# Patient Record
Sex: MALE | RURAL
[De-identification: names, ages, dates, MRNs, and addresses within clinical notes are randomized per-mention and may not be internally consistent; named-entity substitution may affect disease eponyms.]

---

## 2023-03-14 ENCOUNTER — TELEPHONE (OUTPATIENT)
Dept: FAMILY MEDICINE CLINIC | Facility: CLINIC | Age: 73
End: 2023-03-14

## 2023-03-14 RX ORDER — HYDROCHLOROTHIAZIDE 12.5 MG/1
CAPSULE, GELATIN COATED ORAL
Qty: 90 CAPSULE | Refills: 0 | Status: SHIPPED | OUTPATIENT
Start: 2023-03-14 | End: 2023-03-14 | Stop reason: SDUPTHER

## 2023-03-14 RX ORDER — AMLODIPINE BESYLATE AND BENAZEPRIL HYDROCHLORIDE 10; 40 MG/1; MG/1
1 CAPSULE ORAL DAILY
Qty: 90 CAPSULE | Refills: 0 | Status: SHIPPED | OUTPATIENT
Start: 2023-03-14 | End: 2023-03-17 | Stop reason: SDUPTHER

## 2023-03-14 RX ORDER — HYDROCHLOROTHIAZIDE 12.5 MG/1
12.5 CAPSULE, GELATIN COATED ORAL DAILY
Qty: 90 CAPSULE | Refills: 0 | Status: SHIPPED | OUTPATIENT
Start: 2023-03-14 | End: 2023-03-17 | Stop reason: SDUPTHER

## 2023-03-14 RX ORDER — AMLODIPINE BESYLATE AND BENAZEPRIL HYDROCHLORIDE 10; 40 MG/1; MG/1
CAPSULE ORAL
Qty: 90 CAPSULE | Refills: 0 | Status: SHIPPED | OUTPATIENT
Start: 2023-03-14 | End: 2023-03-14 | Stop reason: SDUPTHER

## 2023-03-14 NOTE — TELEPHONE ENCOUNTER
Pt contacted appt scheduled will you send one month so that he does not run out. [facility] pharmacy told to arrive 15 mins early to his apt since hasnt been here since nextgen.  Confirmed meds and doses with pt.

## 2023-03-14 NOTE — TELEPHONE ENCOUNTER
The prescriptions to tide him over until his appt cannot be sent to pharmacy because he has not been seen since switching to [employer]. I have printed them, and he can  or you can fax/call them in to pharmacy. This is because the patient's demographics are not up to date. They are in TO DO box at my work station

## 2023-03-17 ENCOUNTER — TELEPHONE (OUTPATIENT)
Dept: FAMILY MEDICINE CLINIC | Facility: CLINIC | Age: 73
End: 2023-03-17

## 2023-03-17 RX ORDER — AMLODIPINE BESYLATE AND BENAZEPRIL HYDROCHLORIDE 10; 40 MG/1; MG/1
1 CAPSULE ORAL DAILY
Qty: 90 CAPSULE | Refills: 0 | Status: SHIPPED | OUTPATIENT
Start: 2023-03-17 | End: 2023-03-17 | Stop reason: SDUPTHER

## 2023-03-17 RX ORDER — HYDROCHLOROTHIAZIDE 12.5 MG/1
12.5 CAPSULE, GELATIN COATED ORAL DAILY
Qty: 90 CAPSULE | Refills: 0 | Status: SHIPPED | OUTPATIENT
Start: 2023-03-17

## 2023-03-17 RX ORDER — AMLODIPINE BESYLATE AND BENAZEPRIL HYDROCHLORIDE 10; 40 MG/1; MG/1
1 CAPSULE ORAL DAILY
Qty: 90 CAPSULE | Refills: 0 | Status: SHIPPED | OUTPATIENT
Start: 2023-03-17

## 2023-03-17 RX ORDER — HYDROCHLOROTHIAZIDE 12.5 MG/1
12.5 CAPSULE, GELATIN COATED ORAL DAILY
Qty: 90 CAPSULE | Refills: 0 | Status: SHIPPED | OUTPATIENT
Start: 2023-03-17 | End: 2023-03-17 | Stop reason: SDUPTHER

## 2023-05-11 ENCOUNTER — OFFICE VISIT (OUTPATIENT)
Dept: FAMILY MEDICINE CLINIC | Facility: CLINIC | Age: 73
End: 2023-05-11
Payer: MEDICARE

## 2023-05-11 ENCOUNTER — TELEPHONE (OUTPATIENT)
Dept: FAMILY MEDICINE CLINIC | Facility: CLINIC | Age: 73
End: 2023-05-11

## 2023-05-11 VITALS
OXYGEN SATURATION: 97 % | SYSTOLIC BLOOD PRESSURE: 120 MMHG | DIASTOLIC BLOOD PRESSURE: 74 MMHG | WEIGHT: 180 LBS | HEIGHT: 69 IN | BODY MASS INDEX: 26.66 KG/M2

## 2023-05-11 DIAGNOSIS — Z79.899 ENCOUNTER FOR LONG-TERM (CURRENT) USE OF OTHER MEDICATIONS: ICD-10-CM

## 2023-05-11 DIAGNOSIS — Z12.5 PROSTATE CANCER SCREENING: ICD-10-CM

## 2023-05-11 DIAGNOSIS — Z90.49 H/O WHIPPLE PROCEDURE: ICD-10-CM

## 2023-05-11 DIAGNOSIS — Z00.01 ENCOUNTER FOR GENERAL ADULT MEDICAL EXAMINATION WITH ABNORMAL FINDINGS: Primary | ICD-10-CM

## 2023-05-11 DIAGNOSIS — E55.9 VITAMIN D INSUFFICIENCY: ICD-10-CM

## 2023-05-11 DIAGNOSIS — I10 ESSENTIAL HYPERTENSION, BENIGN: ICD-10-CM

## 2023-05-11 DIAGNOSIS — Z85.46 HISTORY OF PROSTATE CANCER: ICD-10-CM

## 2023-05-11 DIAGNOSIS — Z86.010 HISTORY OF COLON POLYPS: ICD-10-CM

## 2023-05-11 DIAGNOSIS — K21.9 GASTROESOPHAGEAL REFLUX DISEASE WITHOUT ESOPHAGITIS: ICD-10-CM

## 2023-05-11 DIAGNOSIS — Z90.410 H/O WHIPPLE PROCEDURE: ICD-10-CM

## 2023-05-11 DIAGNOSIS — K86.81 EXOCRINE PANCREATIC INSUFFICIENCY: ICD-10-CM

## 2023-05-11 DIAGNOSIS — E78.5 DYSLIPIDEMIA: ICD-10-CM

## 2023-05-11 DIAGNOSIS — R73.9 BLOOD GLUCOSE ELEVATED: ICD-10-CM

## 2023-05-11 PROBLEM — Z86.0100 HISTORY OF COLON POLYPS: Status: ACTIVE | Noted: 2023-05-11

## 2023-05-11 RX ORDER — AMLODIPINE BESYLATE AND BENAZEPRIL HYDROCHLORIDE 10; 40 MG/1; MG/1
1 CAPSULE ORAL DAILY
Qty: 90 CAPSULE | Refills: 3 | Status: SHIPPED | OUTPATIENT
Start: 2023-05-11

## 2023-05-11 RX ORDER — HYDROCHLOROTHIAZIDE 12.5 MG/1
12.5 CAPSULE, GELATIN COATED ORAL DAILY
Qty: 90 CAPSULE | Refills: 3 | Status: SHIPPED | OUTPATIENT
Start: 2023-05-11

## 2023-05-11 RX ORDER — PANTOPRAZOLE SODIUM 40 MG/1
40 TABLET, DELAYED RELEASE ORAL DAILY
COMMUNITY

## 2023-05-11 NOTE — PROGRESS NOTES
"Chief Complaint  Annual Exam    Subjective      Rajiv Harris presents to [facility]  History of Present Illness  Patient is here for annual exam.  He denies any complaints today.  He had a Whipple procedure 12 years ago for a pancreatic cyst, and since then he does take Creon as needed for exocrine pancreatic insufficiency symptoms, but not daily.  He also takes pantoprazole for GERD as needed, but not frequently.  His hypertension has been well controlled.  Objective   Vital Signs:   Vitals:    05/11/23 1454   BP: 120/74   BP Location: Left arm   Patient Position: Sitting   Cuff Size: Adult   SpO2: 97%   Weight: 81.6 kg (180 lb)   Height: 175.3 cm (69\")      /74 (BP Location: Left arm, Patient Position: Sitting, Cuff Size: Adult)   Ht 175.3 cm (69\")   Wt 81.6 kg (180 lb)   SpO2 97%   BMI 26.58 kg/m²     Body mass index is 26.58 kg/m².    Review of Systems   Constitutional: Negative for chills, fever and unexpected weight loss.   HENT: Negative for ear discharge, ear pain, mouth sores, nosebleeds, rhinorrhea, sinus pressure, sore throat, swollen glands and trouble swallowing.    Eyes: Negative for blurred vision, double vision, pain, redness and visual disturbance.   Respiratory: Negative for cough, chest tightness, shortness of breath and wheezing.    Cardiovascular: Negative for chest pain, palpitations and leg swelling.        PND, orthopnea   Gastrointestinal: Positive for GERD. Negative for abdominal distention, abdominal pain, blood in stool, constipation, diarrhea, nausea and vomiting.        Dysphagia, odynophagia   Endocrine: Negative for polydipsia, polyphagia and polyuria.   Genitourinary: Negative for difficulty urinating, dysuria, frequency, hematuria and urinary incontinence.   Musculoskeletal: Positive for back pain (Occasional, but chronic and stable). Negative for arthralgias (unusual/atypica), gait problem, joint swelling, myalgias and neck pain.   Skin: " Negative for rash, skin lesions (worrisome/suspicious) and bruise.   Allergic/Immunologic: Negative for food allergies.   Neurological: Negative for dizziness, tremors, seizures, syncope, weakness, light-headedness, numbness, headache and memory problem.   Hematological: Negative for adenopathy. Does not bruise/bleed easily.   Psychiatric/Behavioral: Negative for suicidal ideas and depressed mood. The patient is not nervous/anxious.        Past History:  Medical History: has a past medical history of Colon polyp, Essential hypertension, GERD (gastroesophageal reflux disease), Pancreatic cyst (2011), and Prostate cancer (2006).   Surgical History: has a past surgical history that includes Prostatectomy (2006); Whipple Procedure (2011); and Colonoscopy w/ polypectomy (2020).   Family History: family history is not on file.   Social History: reports that he has never smoked. He has never used smokeless tobacco. He reports that he does not drink alcohol.      Current Outpatient Medications:   •  amLODIPine-benazepril (LOTREL) 10-40 MG per capsule, Take 1 capsule by mouth Daily., Disp: 90 capsule, Rfl: 3  •  hydroCHLOROthiazide (MICROZIDE) 12.5 MG capsule, Take 1 capsule by mouth Daily., Disp: 90 capsule, Rfl: 3  •  pantoprazole (PROTONIX) 40 MG EC tablet, Take 1 tablet by mouth Daily., Disp: , Rfl:     Allergies: Patient has no known allergies.    Physical Exam  Constitutional:       General: He is not in acute distress.     Appearance: He is not toxic-appearing.   HENT:      Head: Normocephalic and atraumatic.      Right Ear: Tympanic membrane, ear canal and external ear normal.      Left Ear: Tympanic membrane, ear canal and external ear normal.      Nose: Nose normal.      Mouth/Throat:      Mouth: Mucous membranes are moist.      Pharynx: Oropharynx is clear.   Eyes:      General: No scleral icterus.     Extraocular Movements: Extraocular movements intact.      Conjunctiva/sclera: Conjunctivae normal.      Pupils:  Pupils are equal, round, and reactive to light.   Neck:      Vascular: No carotid bruit.   Cardiovascular:      Rate and Rhythm: Normal rate and regular rhythm.      Pulses: Normal pulses.      Heart sounds: Normal heart sounds.   Pulmonary:      Effort: Pulmonary effort is normal.      Breath sounds: Normal breath sounds.   Chest:      Chest wall: No tenderness.   Abdominal:      General: Bowel sounds are normal. There is no distension.      Palpations: Abdomen is soft.      Tenderness: There is no abdominal tenderness. There is no guarding or rebound.   Musculoskeletal:         General: No swelling or deformity. Normal range of motion.      Cervical back: Normal range of motion. No rigidity.      Right lower leg: No edema.      Left lower leg: No edema.   Lymphadenopathy:      Cervical: No cervical adenopathy.   Skin:     General: Skin is warm and dry.      Capillary Refill: Capillary refill takes less than 2 seconds.   Neurological:      General: No focal deficit present.      Mental Status: He is alert and oriented to person, place, and time.      Cranial Nerves: No cranial nerve deficit.      Motor: No weakness.      Coordination: Coordination normal.      Gait: Gait normal.   Psychiatric:         Mood and Affect: Mood normal.         Behavior: Behavior normal.         Thought Content: Thought content normal.         Judgment: Judgment normal.                   Assessment and Plan   Diagnoses and all orders for this visit:    1. Encounter for general adult medical examination with abnormal findings (Primary)  Healthy lifestyle measures including healthy diet regular exercise discussed, but the patient already does those things and was praised for this.  He has shown excellent compliance with medications as well and was praised for this.  Preventive healthcare measures were also discussed.  I will refill his current medications and check labs.  If all goes well I will see him back in 1 year or sooner if  needed  2. Essential hypertension, benign    3. Dyslipidemia  -     Lipid Panel; Future  -     Lipid Panel    4. Gastroesophageal reflux disease without esophagitis  Patient only takes pantoprazole occasionally and does not need a refill now  5. Encounter for long-term (current) use of other medications  -     CBC & Differential; Future  -     Comprehensive Metabolic Panel; Future  -     CBC & Differential  -     Comprehensive Metabolic Panel    6. Prostate cancer screening  -     PSA Screen; Future  -     PSA Screen    7. Vitamin D insufficiency  -     Vitamin D,25-Hydroxy    8. Blood glucose elevated  -     Hemoglobin A1c; Future  -     Hemoglobin A1c  This was seen at the time that he had the cyst on his pancreas and he has not had any significant issues and since that time, but we have been checking his hemoglobin A1c annually as a precaution  9. History of prostate cancer  Patient has done well with his treatment and is never shown any signs of recurrence  10. H/O Whipple procedure    11. Exocrine pancreatic insufficiency  Patient uses Creon 24,000 units 3 times daily as needed, but only when he feels like he needs it.  12. History of colon polyps  Colonoscopy report needs to be obtained, the patient said it was about 2 and half to 3 years ago and will not be due until 5 years since his last colonoscopy.  We will obtain report and help him to keep track of this.  He is always been very good about keeping up with his follow-up appointments, but the GI doctor that did his scope moved out of the area last year, although his clinic has been assumed by another practice and they have been good about notifying patients when they are due.  Other orders  -     amLODIPine-benazepril (LOTREL) 10-40 MG per capsule; Take 1 capsule by mouth Daily.  Dispense: 90 capsule; Refill: 3  -     hydroCHLOROthiazide (MICROZIDE) 12.5 MG capsule; Take 1 capsule by mouth Daily.  Dispense: 90 capsule; Refill: 3            Follow Up    Return in about 1 year (around 5/11/2024) for Annual physical.  Patient was given instructions and counseling regarding his condition or for health maintenance advice. Please see specific information pulled into the AVS if appropriate.     Jaquan Boyer MD

## 2023-05-11 NOTE — TELEPHONE ENCOUNTER
Pearl, pt says Dr Barone did  C scope about 3yrs ago, please get report, will be due 5yrs after that one, so update Care Gaps. Also, he takes Creon 24,000 units TID with meals PRN--please enter on med list (I have not figured out how to do that without sending it to pharmacy yet). Thanks!

## 2023-05-12 LAB
25(OH)D3+25(OH)D2 SERPL-MCNC: 24.5 NG/ML (ref 30–100)
BASOPHILS # BLD AUTO: 0.1 X10E3/UL (ref 0–0.2)
BASOPHILS NFR BLD AUTO: 1 %
EOSINOPHIL # BLD AUTO: 0.2 X10E3/UL (ref 0–0.4)
EOSINOPHIL NFR BLD AUTO: 3 %
ERYTHROCYTE [DISTWIDTH] IN BLOOD BY AUTOMATED COUNT: 12.6 % (ref 11.6–15.4)
HBA1C MFR BLD: 5.2 % (ref 4.8–5.6)
HCT VFR BLD AUTO: 45.4 % (ref 37.5–51)
HGB BLD-MCNC: 15.9 G/DL (ref 13–17.7)
IMM GRANULOCYTES # BLD AUTO: 0.1 X10E3/UL (ref 0–0.1)
IMM GRANULOCYTES NFR BLD AUTO: 1 %
LYMPHOCYTES # BLD AUTO: 1.4 X10E3/UL (ref 0.7–3.1)
LYMPHOCYTES NFR BLD AUTO: 15 %
MCH RBC QN AUTO: 29.9 PG (ref 26.6–33)
MCHC RBC AUTO-ENTMCNC: 35 G/DL (ref 31.5–35.7)
MCV RBC AUTO: 86 FL (ref 79–97)
MONOCYTES # BLD AUTO: 0.8 X10E3/UL (ref 0.1–0.9)
MONOCYTES NFR BLD AUTO: 9 %
NEUTROPHILS # BLD AUTO: 6.5 X10E3/UL (ref 1.4–7)
NEUTROPHILS NFR BLD AUTO: 71 %
PLATELET # BLD AUTO: 281 X10E3/UL (ref 150–450)
RBC # BLD AUTO: 5.31 X10E6/UL (ref 4.14–5.8)
WBC # BLD AUTO: 9 X10E3/UL (ref 3.4–10.8)

## 2023-05-13 LAB
ALBUMIN SERPL-MCNC: 4.2 G/DL (ref 3.7–4.7)
ALBUMIN/GLOB SERPL: 1.6 {RATIO} (ref 1.2–2.2)
ALP SERPL-CCNC: 81 IU/L (ref 44–121)
ALT SERPL-CCNC: 18 IU/L (ref 0–44)
AST SERPL-CCNC: 17 IU/L (ref 0–40)
BILIRUB SERPL-MCNC: 0.5 MG/DL (ref 0–1.2)
BUN SERPL-MCNC: 21 MG/DL (ref 8–27)
BUN/CREAT SERPL: 16 (ref 10–24)
CALCIUM SERPL-MCNC: 9.6 MG/DL (ref 8.6–10.2)
CHLORIDE SERPL-SCNC: 103 MMOL/L (ref 96–106)
CHOLEST SERPL-MCNC: 159 MG/DL (ref 100–199)
CO2 SERPL-SCNC: 23 MMOL/L (ref 20–29)
CREAT SERPL-MCNC: 1.28 MG/DL (ref 0.76–1.27)
EGFRCR SERPLBLD CKD-EPI 2021: 59 ML/MIN/1.73
GLOBULIN SER CALC-MCNC: 2.6 G/DL (ref 1.5–4.5)
GLUCOSE SERPL-MCNC: 85 MG/DL (ref 70–99)
HDLC SERPL-MCNC: 39 MG/DL
LDLC SERPL CALC-MCNC: 105 MG/DL (ref 0–99)
POTASSIUM SERPL-SCNC: 4.1 MMOL/L (ref 3.5–5.2)
PROT SERPL-MCNC: 6.8 G/DL (ref 6–8.5)
PSA SERPL-MCNC: <0.1 NG/ML (ref 0–4)
SODIUM SERPL-SCNC: 141 MMOL/L (ref 134–144)
TRIGL SERPL-MCNC: 78 MG/DL (ref 0–149)
VLDLC SERPL CALC-MCNC: 15 MG/DL (ref 5–40)

## 2023-05-18 ENCOUNTER — TELEPHONE (OUTPATIENT)
Dept: FAMILY MEDICINE CLINIC | Facility: CLINIC | Age: 73
End: 2023-05-18
Payer: MEDICARE

## 2023-05-18 DIAGNOSIS — D12.6 ADENOMATOUS POLYP OF COLON, UNSPECIFIED PART OF COLON: Primary | ICD-10-CM

## 2023-05-18 NOTE — TELEPHONE ENCOUNTER
Please let pt know I got his colonoscpoy report and pathology from Dr Barone, it was done 9/2018, and there were 4 polyps removed, and 3 were pre-cancerous adenomatous polyps, so he advised in the note that pt needed another scope in 3 years--would have been due 9/2021--thus, he is 1.5 yrs overdue! We are scheduling consult with GI, as Abisai has retired. Thanks!

## 2023-05-30 DIAGNOSIS — Z86.010 HISTORY OF COLON POLYPS: Primary | ICD-10-CM

## 2023-06-09 ENCOUNTER — TELEPHONE (OUTPATIENT)
Dept: FAMILY MEDICINE CLINIC | Facility: CLINIC | Age: 73
End: 2023-06-09

## 2023-06-09 NOTE — TELEPHONE ENCOUNTER
HUB TO READ    Left message.      He is scheduled June 26 at 930am with Dr Kelsey  [facility].  [address] [place] [address]  Phone: [phone]

## 2023-10-23 ENCOUNTER — TELEPHONE (OUTPATIENT)
Dept: FAMILY MEDICINE CLINIC | Facility: CLINIC | Age: 73
End: 2023-10-23

## 2023-10-23 NOTE — TELEPHONE ENCOUNTER
Caller: Rajiv Harris    Relationship: Self    Best call back number: [phone]    What is the medical concern/diagnosis: LOWER BACK PAIN    What specialty or service is being requested: [facility]    What is the provider, practice or medical service name:     What is the office location:     What is the office phone number: [phone]    Any additional details: PLEASE CALL IF PATIENT IF HE NEEDS TO SEE DR. ALEMAN

## 2023-10-24 NOTE — TELEPHONE ENCOUNTER
PATIENT CALLED BACK TO CHECK ON THE STATUS OF HIS REFERRAL REQUEST. HE STATED HE WOULD LIKE THIS PROCESSED AS SOON AS POSSIBLE.    PATIENT STATED IF AN APPOINTMENT IS REQUIRED PLEASE CALL HIM TO SCHEDULE. [phone]

## 2024-06-26 RX ORDER — HYDROCHLOROTHIAZIDE 12.5 MG/1
12.5 CAPSULE, GELATIN COATED ORAL DAILY
Qty: 90 CAPSULE | Refills: 3 | OUTPATIENT
Start: 2024-06-26

## 2024-06-26 RX ORDER — AMLODIPINE AND BENAZEPRIL HYDROCHLORIDE 10; 40 MG/1; MG/1
1 CAPSULE ORAL DAILY
Qty: 90 CAPSULE | Refills: 3 | OUTPATIENT
Start: 2024-06-26

## 2025-03-24 ENCOUNTER — OFFICE VISIT (OUTPATIENT)
Dept: NEUROSURGERY | Facility: CLINIC | Age: 75
End: 2025-03-24
Payer: MEDICARE

## 2025-03-24 VITALS — HEIGHT: 68 IN | TEMPERATURE: 97 F | BODY MASS INDEX: 28.64 KG/M2 | WEIGHT: 189 LBS

## 2025-03-24 DIAGNOSIS — G89.29 CHRONIC BILATERAL LOW BACK PAIN WITHOUT SCIATICA: Primary | ICD-10-CM

## 2025-03-24 DIAGNOSIS — M54.50 CHRONIC BILATERAL LOW BACK PAIN WITHOUT SCIATICA: Primary | ICD-10-CM

## 2025-03-24 DIAGNOSIS — M47.819 FACET ARTHROPATHY: ICD-10-CM

## 2025-03-24 PROCEDURE — 99204 OFFICE O/P NEW MOD 45 MIN: CPT | Performed by: NEUROLOGICAL SURGERY

## 2025-03-24 NOTE — PROGRESS NOTES
NAME: JACKIE GARAY   DOS: 3/24/2025  : 1950  PCP: Frantz Bolden MD    Chief Complaint:    Chief Complaint   Patient presents with    Back Pain       History of Present Illness:  74 y.o. 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  He reports bilateral occasional gluteal pain mostly around the SI joint he denies flagrant symptoms of neurogenic claudication he has done physical therapy and denies bowel bladder incontinence issues he states that a lot of the pain started after a Whipple procedure for benign issues he does have a history of prostate cancer that is local he is here for evaluation        PMHX  Allergies:  No Known Allergies  Medications    Current Outpatient Medications:     amLODIPine-benazepril (LOTREL) 10-40 MG per capsule, Take 1 capsule by mouth Daily., Disp: 90 capsule, Rfl: 3    hydroCHLOROthiazide (MICROZIDE) 12.5 MG capsule, Take 1 capsule by mouth Daily., Disp: 90 capsule, Rfl: 3    pancrelipase, Lip-Prot-Amyl, (CREON) 59624-74487 units capsule delayed-release particles capsule, Take 1 capsule by mouth 3 (Three) Times a Day With Meals., Disp: , Rfl:     pantoprazole (PROTONIX) 40 MG EC tablet, Take 1 tablet by mouth Daily., Disp: , Rfl:   Past Medical History:  Past Medical History:   Diagnosis Date    Colon polyp     Essential hypertension     GERD (gastroesophageal reflux disease)     Low back pain     Pancreatic cyst 2011    Status post Whipple, benign    Prostate cancer 2006    Treated successfully     Past Surgical History:  Past Surgical History:   Procedure Laterality Date    COLONOSCOPY W/ POLYPECTOMY      Records pending, date approximate, repeat due in 5 years    PROSTATECTOMY  2006    WHIPPLE PROCEDURE  2011     Social Hx:  Social History     Tobacco Use    Smoking status: Never    Smokeless tobacco: Never   Vaping Use    Vaping status: Never Used   Substance  Use Topics    Alcohol use: Never    Drug use: Never     Family Hx:  History reviewed. No pertinent family history.  Review of Systems:        Review of Systems   Constitutional:  Negative for activity change, appetite change, chills, diaphoresis, fatigue, fever and unexpected weight change.   HENT:  Negative for congestion, dental problem, drooling, ear discharge, ear pain, facial swelling, hearing loss, mouth sores, nosebleeds, postnasal drip, rhinorrhea, sinus pressure, sinus pain, sneezing, sore throat, tinnitus, trouble swallowing and voice change.    Eyes:  Negative for photophobia, pain, discharge, redness, itching and visual disturbance.   Respiratory:  Negative for apnea, cough, choking, chest tightness, shortness of breath, wheezing and stridor.    Cardiovascular:  Negative for chest pain, palpitations and leg swelling.   Gastrointestinal:  Negative for abdominal distention, abdominal pain, anal bleeding, blood in stool, constipation, diarrhea, nausea, rectal pain and vomiting.   Endocrine: Negative for cold intolerance, heat intolerance, polydipsia, polyphagia and polyuria.   Genitourinary:  Negative for decreased urine volume, difficulty urinating, dysuria, enuresis, flank pain, frequency, genital sores, hematuria, penile discharge, penile pain, penile swelling, scrotal swelling, testicular pain and urgency.   Musculoskeletal:  Positive for back pain. Negative for arthralgias, gait problem, joint swelling, myalgias, neck pain and neck stiffness.   Skin:  Negative for color change, pallor, rash and wound.   Allergic/Immunologic: Negative for environmental allergies, food allergies and immunocompromised state.   Neurological:  Negative for dizziness, tremors, seizures, syncope, facial asymmetry, speech difficulty, weakness, light-headedness, numbness and headaches.   Hematological:  Negative for adenopathy. Does not bruise/bleed easily.   Psychiatric/Behavioral:  Negative for agitation, behavioral problems,  confusion, decreased concentration, dysphoric mood, hallucinations, self-injury, sleep disturbance and suicidal ideas. The patient is not nervous/anxious and is not hyperactive.       I have reviewed this note template and all pertinent parts of the review of systems social, family history, surgical history and medication list    Physical Examination:  Vitals:    03/24/25 1238   Temp: 97 °F (36.1 °C)      General Appearance:   Well developed, well nourished, well groomed, alert, and cooperative.  Neurological examination:  Neurological Exam   Vital signs were reviewed and documented in the chart  Patient appeared in good neurologic function with normal comprehension fluent speech  Mood and affect are normal  Sense of smell deferred    He is got a flatback  Muscle bulk and tone normal  5 out of 5 strength no motor drift he is strong in his upper extremities  Gait normal intact  Negative Romberg  No clonus long tract signs or myelopathy    Reflexes symmetric present at the right knee trace to absent throughout otherwise  No edema noted and extremities skin appears normal    Straight leg raise sign absent  No signs of intrinsic hip dysfunction  Back is without any lesions or abnormality          Review of Imaging/DATA:  I personally reviewed and interpreted MRI of the lumbar spine he is got ankylosis at 4 on 5 with higher-grade central stenosis at 5 1 lateral recess stenosis is pretty significant with a combination of bony osteophytes disc bulging but I can still make out a lot of the anatomy of the central canal I suspect he is ankylosed at the 2 through 5 area  Diagnoses/Plan:    Mr. Harris is a 74 y.o. male   1.  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  L5-S1 significant lateral recess syndrome with some degree of intraforaminal involvement    I explained the risk benefits and expected outcome of major elective surgery for their problem,  complications from approach, and infection, the risk of neurologic implications after surgery as well as need for repeat surgeries and most importantly failure to achieve quality of life improvement from the surgery to the patient.  I talked about the role of laminectomies and fusions I explained that with that gentleman with is much disc osteophyte complex and I suspect ankylosis in the L2-5 area I suspect he likely would require fusion to manage back pain he seems to indicate that he was not interested in that right now and I think it is perfectly reasonable to manage him conservatively    After extensive discussion shared decision making plan will be    1.  I expressed the importance of physical therapy    2.  I think a referral to interventional pain management would be in order for potentially rhizotomies and/or epidural blocks if his symptoms were to fail I think it be reasonable to proceed with lumbar flexion-extension films and we could contemplate role of laminectomy and/or fusion depending on the results of further workup    I explained the signs and symptoms to look for necessitate a referral back has been a pleasure to provide neurosurgical care

## 2025-04-03 ENCOUNTER — TELEPHONE (OUTPATIENT)
Dept: PAIN MEDICINE | Facility: CLINIC | Age: 75
End: 2025-04-03
Payer: MEDICARE

## 2025-04-03 NOTE — TELEPHONE ENCOUNTER
Caller: Rajiv Harris    Relationship to patient: Self    Best call back number: [phone]    Chief complaint: ANGEL LOW BACK PAIN     Type of visit: NEW PATIENT     Requested date: ASAP    Additional notes:PATIENT RETURNING CRYSTAL CALL TO SCHEDULE AT THE [place] OFFICE. PLEASE CALL PATIENT TO SCHEDULE.

## 2025-04-04 ENCOUNTER — OFFICE VISIT (OUTPATIENT)
Dept: PAIN MEDICINE | Facility: CLINIC | Age: 75
End: 2025-04-04
Payer: MEDICARE

## 2025-04-04 VITALS — HEIGHT: 69 IN | WEIGHT: 186 LBS | BODY MASS INDEX: 27.55 KG/M2

## 2025-04-04 DIAGNOSIS — M47.817 LUMBOSACRAL SPONDYLOSIS WITHOUT MYELOPATHY: Primary | ICD-10-CM

## 2025-04-04 DIAGNOSIS — G89.4 CHRONIC PAIN SYNDROME: ICD-10-CM

## 2025-04-04 NOTE — PROGRESS NOTES
Referring Physician: Santos Hussein MD  [id number] [facility]  [address]  [place] [address]    Primary Physician: Frantz Bolden MD    CHIEF COMPLAINT or REASON FOR VISIT: Back Pain (New patient)      Initial history of present illness on 04/04/2025:  Mr. Rajiv Harris is 74 y.o. male who presents as a new patient referral for evaluation treatment of chronic low back pain.  Patient reports approximately 5 to 6-year history of axial low back pain.  He has noticed overall muscle atrophy since undergoing Whipple procedure 14 years ago.  He denies any radiation to the lower extremities.  Denies any history of spinal surgery intervention.  He has completed physical therapy with management of it.  Has tried NSAIDs, acetaminophen with modest benefit.  He has been evaluated by neurosurgery, Dr. Santos Hussein MD, who referred for nonsurgical management.  Patient denies any new onset bowel or bladder dysfunction, lower extremity weakness, saddle anesthesia or unexplained weight loss.       Interval history:    Interventions:      Objective Pain Scoring:   BRIEF PAIN INVENTORY:  Total score:   Pain Score    04/04/25 1006   PainSc: 3    PainLoc: Back      PHQ-2: 0  PHQ-9:    Opioid Risk Tool:         Review of Systems:   ROS negative except as otherwise noted     Past Medical History:   Past Medical History:   Diagnosis Date    Colon polyp     Essential hypertension     GERD (gastroesophageal reflux disease)     Low back pain     Pancreatic cyst 2011    Status post Whipple, benign    Prostate cancer 2006    Treated successfully         Past Surgical History:   Past Surgical History:   Procedure Laterality Date    COLONOSCOPY W/ POLYPECTOMY  2020    Records pending, date approximate, repeat due in 5 years    PROSTATECTOMY  2006    WHIPPLE PROCEDURE  2011         Family History   No family history on file.      Social History   Social History     Socioeconomic History    Marital status:    Tobacco Use     "Smoking status: Never    Smokeless tobacco: Never   Vaping Use    Vaping status: Never Used   Substance and Sexual Activity    Alcohol use: Never    Drug use: Never    Sexual activity: Defer        Medications:     Current Outpatient Medications:     amLODIPine-benazepril (LOTREL) 10-40 MG per capsule, Take 1 capsule by mouth Daily., Disp: 90 capsule, Rfl: 3    hydroCHLOROthiazide (MICROZIDE) 12.5 MG capsule, Take 1 capsule by mouth Daily., Disp: 90 capsule, Rfl: 3    pantoprazole (PROTONIX) 40 MG EC tablet, Take 1 tablet by mouth Daily., Disp: , Rfl:     pancrelipase, Lip-Prot-Amyl, (CREON) 88327-20706 units capsule delayed-release particles capsule, Take 1 capsule by mouth 3 (Three) Times a Day With Meals. (Patient not taking: Reported on 4/4/2025), Disp: , Rfl:         Physical Exam:     Vitals:    04/04/25 1006   Weight: 84.4 kg (186 lb)   Height: 175.3 cm (69\")   PainSc: 3    PainLoc: Back        General: Alert and oriented, No acute distress.   HEENT: Normocephalic, atraumatic.   Cardiovascular: No gross edema  Respiratory: Respirations are non-labored       Lumbar Spine:   No masses or atrophy  Range of motion - Flexion normal. Extension normal.    Facet Loading: Positive bilaterally  Facet Palpation - Tender  Juan Ojse finger/Gaenslen's/Joshua's/ELICEO/Thigh thrust -   Straight leg raise/slump test: Negative bilaterally  Multifidus toe-touch test:    Motor Exam:       Strength: Rate on 1-5 scale Right Left    L1/2- hip flexion 5/5  5/5    L3- knee extension 5/5  5/5    L4- ankle dorsiflexion 5/5  5/5    L5- great toe extension 5/5  5/5    S1- ankle plantarflexion 5/5  5/5    Sensory Exam: Full and equal sensation to light touch throughout.       Neurologic: Cranial Nerves II-XII are grossly intact.      Psychiatric: Cooperative.   Gait: Normal   Assistive Devices: None    Imaging Studies:   No results found for this or any previous visit.        Independent review of radiographic imaging: Stable from " interpretation is lumbar MRI dated February 12, 2025 demonstrating diffuse severe degenerative disc disease and facet arthropathy with lateral recess stenosis at L5/S1.  The left L5/S1 facet is severely hypertrophic.  There is bilateral L4/L5 and L5/S1 neuroforaminal stenosis.  There are Modic 1 endplate changes at L4/L5.    Impression & Plan:       04/04/2025: Rajiv Harris is a 74 y.o. male with past medical history significant for HTN, GERD, history of Whipple, who presents to the pain clinic for evaluation and treatment of chronic axial low back pain.  MRI interpretation as above.  Evaluation consistent with lumbar spondylosis without myelopathy.  We discussed lumbar medial branch blocks and, if appropriate, radiofrequency ablation.  I had a discussion with the patient regarding the risks of the procedure including bleeding, infection, damage to surrounding structures.    1. Lumbosacral spondylosis without myelopathy    2. Chronic pain syndrome        PLAN:  1. Medications:      2. Physical Therapy: Continue HEP    3. Psychological: defer    4. Complementary and alternative (CAM) Therapies:     5. Labs/Diagnostic studies: None indicated     6. Imaging: MRI independently interpreted and reviewed with patient    7. Interventions: Schedule bilateral L4/L5 and L5/S1 medial branch blocks.  If the first blocks provide diagnostic relief will schedule a second set of medial branch blocks.  If second set of medial branch blocks provides diagnostic relief will schedule rhizotomy.    8. Referrals: None indicated     9. Records: n/a    10. Lifestyle goals:    Follow-up 3 months      [facility]  Ethan Dong MD          Quality Metrics:                Please note that portions of this note were completed with a voice recognition program.      Any copied data in any portion of my note from previous notes included in the HPI, PE, MDM and/or assessment and plan has been reviewed by myself  and accurate as of this date.      The 21st Century Cures Act makes medical notes like this available to patients in the interest of transparency. This is a medical document intended as peer to peer communication. It is written in medical language and may contain abbreviations or verbiage that are unfamiliar. It may appear blunt or direct. Medical documents are intended to carry relevant information, facts as evident, and the clinical opinion of the practitioner.

## 2025-04-10 ENCOUNTER — OUTSIDE FACILITY SERVICE (OUTPATIENT)
Dept: PAIN MEDICINE | Facility: CLINIC | Age: 75
End: 2025-04-10
Payer: MEDICARE

## 2025-04-10 ENCOUNTER — DOCUMENTATION (OUTPATIENT)
Dept: PAIN MEDICINE | Facility: CLINIC | Age: 75
End: 2025-04-10

## 2025-04-10 PROCEDURE — 64494 INJ PARAVERT F JNT L/S 2 LEV: CPT | Performed by: STUDENT IN AN ORGANIZED HEALTH CARE EDUCATION/TRAINING PROGRAM

## 2025-04-10 PROCEDURE — 64493 INJ PARAVERT F JNT L/S 1 LEV: CPT | Performed by: STUDENT IN AN ORGANIZED HEALTH CARE EDUCATION/TRAINING PROGRAM

## 2025-04-10 NOTE — PROGRESS NOTES
[facility]  [address] [place] [address]    PROCEDURE: Fluoroscopically-guided bilateral L3,4,5 Lumbar Medial Branch Nerve Blocks targeting the bilateral L4/5 and L5/S1 facet joints  PRE-OP DIAGNOSIS: Lumbar spondylosis  POST-OP DIAGNOSIS: Lumbar spondylosis    ANTIPLATELET/ANTICOAGULANT STOP DATE: Discussed with the patient held according to ERIC guidelines    CONSENT: Risks, benefits and options were explained to the patient, all questions were answered and written informed consent was obtained.  ANESTHESIA: Local only  PROCEDURE NOTE:  A pre-procedural time out was performed to confirm the correct patient, procedure, side, and site. A sterile field was prepped in standard fashion using Chlorhexidine and draped with sterile towels. The selected medial branch nerves were identified using an ipsilateral oblique fluoroscopic view. A 25 gauge 3.5 inch spinal needle was advanced using intermittent fluoroscopy toward the junction of the transverse process and superior articulating process targeting the above listed medial branch nerves. The L5 primary dorsal ramus nerve was targeted at the junction of the sacral ala and the sacral articular process. Needle placement was confirmed with biplanar fluoroscopic imaging. Following negative aspiration, 1 mL of bupivacaine 0.5% was injected at each location. The needles were re-styletted and withdrawn. The patient's skin was cleaned with alcohol and the injection sites covered with bandages.   EBL: None  COMPLICATIONS: None      The patient was monitored until meeting established discharge criteria.  Vital signs remained stable throughout the procedure and in the recovery area. There were no immediate complications and the patient tolerated the procedure well. Sensory and motor exam was unchanged from baseline. The patient received written discharge instructions prior to discharge.  FOLLOW UP: As scheduled  ADDITIONAL NOTES:  Clinic staff will call to schedule #2 medial branch block    [facility]    Ethan Dong MD    Codes:  [id number]  [phone]

## 2025-04-15 ENCOUNTER — TELEPHONE (OUTPATIENT)
Dept: PAIN MEDICINE | Facility: CLINIC | Age: 75
End: 2025-04-15
Payer: MEDICARE

## 2025-04-15 NOTE — TELEPHONE ENCOUNTER
FOLLOW-UP CALL AFTER PROCEDURE    I spoke with the patient regarding how he is feeling after his procedure with Dr. Dong. Patient reports he/she is doing well.      Rajiv Harris underwent a Bilateral L4/L5 and L5/S1 medial branch blocks  on 4/10/2025.      Patient reported 70% pain relief that lasted for multiple hours.      Scheduled patient for follow up with Timmy

## 2025-05-02 ENCOUNTER — OFFICE VISIT (OUTPATIENT)
Dept: PAIN MEDICINE | Facility: CLINIC | Age: 75
End: 2025-05-02
Payer: MEDICARE

## 2025-05-02 VITALS — HEIGHT: 69 IN | BODY MASS INDEX: 27.95 KG/M2 | WEIGHT: 188.7 LBS

## 2025-05-02 DIAGNOSIS — G89.4 CHRONIC PAIN SYNDROME: ICD-10-CM

## 2025-05-02 DIAGNOSIS — M47.817 LUMBOSACRAL SPONDYLOSIS WITHOUT MYELOPATHY: ICD-10-CM

## 2025-05-02 DIAGNOSIS — M54.51 VERTEBROGENIC LOW BACK PAIN: Primary | ICD-10-CM

## 2025-05-02 NOTE — PROGRESS NOTES
Referring Physician: No referring provider defined for this encounter.    Primary Physician: Frantz Bolden MD    CHIEF COMPLAINT or REASON FOR VISIT: Follow-up (Failed LMBB) and Back Pain      Initial history of present illness on 04/04/2025:  Mr. Rajiv Harris is 75 y.o. male who presents as a new patient referral for evaluation treatment of chronic low back pain.  Patient reports approximately 5 to 6-year history of axial low back pain.  He has noticed overall muscle atrophy since undergoing Whipple procedure 14 years ago.  He denies any radiation to the lower extremities.  Denies any history of spinal surgery intervention.  He has completed physical therapy with management of it.  Has tried NSAIDs, acetaminophen with modest benefit.  He has been evaluated by neurosurgery, Dr. Santos Hussein MD, who referred for nonsurgical management.  Patient denies any new onset bowel or bladder dysfunction, lower extremity weakness, saddle anesthesia or unexplained weight loss.       Interval history: Patient returns to clinic today after undergoing a lumbar medial branch block.  Unfortunately, this did not provide him with any significant pain relief.  He continues to complain of chronic axial low back pain.  He denies any radiation to the lower extremities.  He does find that his pain is exacerbated with forward flexion or holding heavier objects in front of him.  He would be interested in additional procedures to help alleviate his pain.    Interventions:  4/10/2025: L4/5 and L5/S1 LMBB with minimal relief    Objective Pain Scoring:   BRIEF PAIN INVENTORY:  Total score:   Pain Score    05/02/25 1241   PainSc: 4    PainLoc: Back      PHQ-2: 0  PHQ-9:    Opioid Risk Tool:         Review of Systems:   ROS negative except as otherwise noted     Past Medical History:   Past Medical History:   Diagnosis Date    Colon polyp     Essential hypertension     GERD (gastroesophageal reflux disease)     Low back pain      "Pancreatic cyst 2011    Status post Whipple, benign    Prostate cancer 2006    Treated successfully         Past Surgical History:   Past Surgical History:   Procedure Laterality Date    COLONOSCOPY W/ POLYPECTOMY  2020    Records pending, date approximate, repeat due in 5 years    PROSTATECTOMY  2006    WHIPPLE PROCEDURE  2011         Family History   History reviewed. No pertinent family history.      Social History   Social History     Socioeconomic History    Marital status:    Tobacco Use    Smoking status: Never    Smokeless tobacco: Never   Vaping Use    Vaping status: Never Used   Substance and Sexual Activity    Alcohol use: Never    Drug use: Never    Sexual activity: Defer        Medications:     Current Outpatient Medications:     amLODIPine-benazepril (LOTREL) 10-40 MG per capsule, Take 1 capsule by mouth Daily., Disp: 90 capsule, Rfl: 3    hydroCHLOROthiazide (MICROZIDE) 12.5 MG capsule, Take 1 capsule by mouth Daily., Disp: 90 capsule, Rfl: 3    pancrelipase, Lip-Prot-Amyl, (CREON) 72506-64164 units capsule delayed-release particles capsule, Take 1 capsule by mouth 3 (Three) Times a Day With Meals., Disp: , Rfl:     pantoprazole (PROTONIX) 40 MG EC tablet, Take 1 tablet by mouth Daily., Disp: , Rfl:         Physical Exam:     Vitals:    05/02/25 1241   Weight: 85.6 kg (188 lb 11.2 oz)   Height: 175.3 cm (69.02\")   PainSc: 4    PainLoc: Back        General: Alert and oriented, No acute distress.   HEENT: Normocephalic, atraumatic.   Cardiovascular: No gross edema  Respiratory: Respirations are non-labored       Lumbar Spine:   No masses or atrophy  Range of motion - Flexion normal. Extension normal.    Facet Loading: Positive bilaterally  Facet Palpation - Tender  Juan Jose finger/Gaenslen's/Joshua's/ELICEO/Thigh thrust -   Straight leg raise/slump test: Negative bilaterally  Multifidus toe-touch test:    Pain with forward flexion    Motor Exam:       Strength: Rate on 1-5 scale Right " Left    L1/2- hip flexion 5/5  5/5    L3- knee extension 5/5  5/5    L4- ankle dorsiflexion 5/5  5/5    L5- great toe extension 5/5  5/5    S1- ankle plantarflexion 5/5  5/5    Sensory Exam: Full and equal sensation to light touch throughout.       Neurologic: Cranial Nerves II-XII are grossly intact.      Psychiatric: Cooperative.   Gait: Normal   Assistive Devices: None    Imaging Studies:   No results found for this or any previous visit.        Independent review of radiographic imaging: Stable from interpretation is lumbar MRI dated February 12, 2025 demonstrating diffuse severe degenerative disc disease and facet arthropathy with lateral recess stenosis at L5/S1.  The left L5/S1 facet is severely hypertrophic.  There is bilateral L4/L5 and L5/S1 neuroforaminal stenosis.  There are Modic 1 endplate changes at L4/L5.    Impression & Plan:       04/04/2025: Rajiv Harris is a 75 y.o. male with past medical history significant for HTN, GERD, history of Whipple, who presents to the pain clinic for evaluation and treatment of chronic axial low back pain.  MRI interpretation as above.  Evaluation consistent with lumbar spondylosis without myelopathy.  We discussed lumbar medial branch blocks and, if appropriate, radiofrequency ablation.  I had a discussion with the patient regarding the risks of the procedure including bleeding, infection, damage to surrounding structures.  5/02/2025: Minimal relief from LMBB.  Evaluation consistent with vertebrogenic low back pain.  Discussed L4, L5 Intracept procedure.  Patient would like to take some time to think about this before undergoing any procedure at the moment.    1. Vertebrogenic low back pain    2. Lumbosacral spondylosis without myelopathy    3. Chronic pain syndrome          PLAN:  1. Medications:      2. Physical Therapy: Continue HEP    3. Psychological: defer    4. Complementary and alternative (CAM) Therapies:     5. Labs/Diagnostic studies: None indicated      6. Imagin. Interventions: Discussed L4, L5 basivertebral nerve ablation (Intracept).  Risks of this procedure include bleeding, infection, damage to surrounding structures which may exacerbate symptoms, paralysis, and even death.  Patient voiced understanding.    8. Referrals: None indicated     9. Records: n/a    10. Lifestyle goals:    Follow-up 1 month      [facility]  Matilde Kc PA-C          Quality Metrics:                Please note that portions of this note were completed with a voice recognition program.      Any copied data in any portion of my note from previous notes included in the HPI, PE, MDM and/or assessment and plan has been reviewed by myself and accurate as of this date.      The 21st Century Cures Act makes medical notes like this available to patients in the interest of transparency. This is a medical document intended as peer to peer communication. It is written in medical language and may contain abbreviations or verbiage that are unfamiliar. It may appear blunt or direct. Medical documents are intended to carry relevant information, facts as evident, and the clinical opinion of the practitioner.